# Patient Record
Sex: MALE | Race: WHITE | NOT HISPANIC OR LATINO | Employment: UNEMPLOYED | URBAN - METROPOLITAN AREA
[De-identification: names, ages, dates, MRNs, and addresses within clinical notes are randomized per-mention and may not be internally consistent; named-entity substitution may affect disease eponyms.]

---

## 2017-05-05 RX ORDER — ALBUTEROL SULFATE 90 UG/1
2 AEROSOL, METERED RESPIRATORY (INHALATION) EVERY 4 HOURS PRN
COMMUNITY
End: 2017-05-08

## 2017-05-05 RX ORDER — HYDROCODONE BITARTRATE AND ACETAMINOPHEN 10; 325 MG/1; MG/1
1 TABLET ORAL EVERY 6 HOURS PRN
COMMUNITY
End: 2017-05-08

## 2017-05-05 RX ORDER — IBUPROFEN 800 MG/1
800 TABLET ORAL EVERY 6 HOURS PRN
COMMUNITY
End: 2017-05-08

## 2017-05-05 RX ORDER — LISINOPRIL 20 MG/1
20 TABLET ORAL DAILY
COMMUNITY
End: 2017-05-08

## 2017-05-05 RX ORDER — QUETIAPINE FUMARATE 100 MG/1
100 TABLET, FILM COATED ORAL NIGHTLY
COMMUNITY

## 2017-05-05 RX ORDER — GABAPENTIN 800 MG/1
800 TABLET ORAL 4 TIMES DAILY
COMMUNITY

## 2017-05-05 RX ORDER — TRAMADOL HYDROCHLORIDE 50 MG/1
50 TABLET ORAL EVERY 6 HOURS PRN
COMMUNITY

## 2017-05-05 RX ORDER — FLUOXETINE HYDROCHLORIDE 20 MG/1
20 CAPSULE ORAL DAILY
COMMUNITY

## 2017-05-08 ENCOUNTER — OFFICE VISIT (OUTPATIENT)
Dept: NEUROSURGERY | Facility: CLINIC | Age: 47
End: 2017-05-08

## 2017-05-08 VITALS
BODY MASS INDEX: 22.84 KG/M2 | TEMPERATURE: 98.3 F | DIASTOLIC BLOOD PRESSURE: 80 MMHG | SYSTOLIC BLOOD PRESSURE: 112 MMHG | WEIGHT: 178 LBS | HEIGHT: 74 IN

## 2017-05-08 DIAGNOSIS — M51.36 DEGENERATIVE DISC DISEASE, LUMBAR: Primary | ICD-10-CM

## 2017-05-08 PROCEDURE — 99203 OFFICE O/P NEW LOW 30 MIN: CPT | Performed by: NEUROLOGICAL SURGERY

## 2017-05-08 RX ORDER — NABUMETONE 750 MG/1
750 TABLET, FILM COATED ORAL 2 TIMES DAILY
Qty: 60 TABLET | Refills: 0 | Status: SHIPPED | OUTPATIENT
Start: 2017-05-08

## 2020-11-16 ENCOUNTER — HOSPITAL ENCOUNTER (EMERGENCY)
Facility: HOSPITAL | Age: 50
Discharge: HOME | End: 2020-11-16
Attending: EMERGENCY MEDICINE | Admitting: EMERGENCY MEDICINE

## 2020-11-16 VITALS
RESPIRATION RATE: 18 BRPM | TEMPERATURE: 97.5 F | BODY MASS INDEX: 18.22 KG/M2 | DIASTOLIC BLOOD PRESSURE: 120 MMHG | WEIGHT: 142 LBS | HEIGHT: 74 IN | OXYGEN SATURATION: 98 % | HEART RATE: 91 BPM | SYSTOLIC BLOOD PRESSURE: 158 MMHG

## 2020-11-16 DIAGNOSIS — B35.3 TINEA PEDIS OF BOTH FEET: Primary | ICD-10-CM

## 2020-11-16 DIAGNOSIS — L84 PRE-ULCERATIVE CORN OR CALLOUS: ICD-10-CM

## 2020-11-16 PROCEDURE — 99282 EMERGENCY DEPT VISIT SF MDM: CPT

## 2020-11-16 RX ORDER — MICONAZOLE NITRATE 20 MG/G
POWDER TOPICAL AS NEEDED
Qty: 71 G | Refills: 0 | Status: SHIPPED | OUTPATIENT
Start: 2020-11-16

## 2020-11-16 NOTE — ED PROVIDER NOTES
"Subjective   Mr. Hernandez presents to the emergency department today out of concern for a rash he perceives on his hands arms neck and his feet for several weeks now.  \"If I put Goldbond powder on my skin my hands turn black\".  He alludes to seeing some strange items moving on his skin.  He also refers to seeking attention for this perceived rash several times in other emergency departments without satisfaction.  He denies that it is painful or pruritic.  He denies any illicit drug use or abuse.  Further inquiry to localize the sites of his concern reveal concern out of proportion for well-established calluses.  He denies any medication alterations or additions.      History provided by:  Patient   used: No    Rash  Location: Dorsal hands, entire arms bilaterally and anterior neck and his feet.  Severity:  Unable to specify  Onset quality:  Gradual (4 weeks or more)  Timing:  Unable to specify  Progression:  Unable to specify  Chronicity:  Chronic  Context: not insect bite/sting, not plant contact and not sick contacts    Relieved by:  Nothing  Worsened by:  Nothing  Ineffective treatments:  None tried  Associated symptoms: no diarrhea, no fever, no hoarse voice, no induration, no throat swelling, no tongue swelling, no URI, not vomiting and not wheezing        Review of Systems   Constitutional: Negative.  Negative for appetite change, fever and unexpected weight change (patient appears thin or at least, verylean.  He denies any known weight loss. ).   HENT: Negative for hoarse voice.    Eyes: Negative.    Respiratory: Negative for wheezing.    Gastrointestinal: Negative for diarrhea and vomiting.   Skin: Positive for rash and wound. Negative for color change and pallor.   All other systems reviewed and are negative.      Past Medical History:   Diagnosis Date   • Anxiety    • Arthritis    • Hypertension        No Known Allergies    Past Surgical History:   Procedure Laterality Date   • NO PAST " SURGERIES         Family History   Problem Relation Age of Onset   • Clotting disorder Mother    • Asthma Sister    • Cancer Maternal Grandmother    • Heart disease Maternal Grandfather        Social History     Socioeconomic History   • Marital status: Single     Spouse name: Not on file   • Number of children: Not on file   • Years of education: Not on file   • Highest education level: Not on file   Tobacco Use   • Smoking status: Current Every Day Smoker     Years: 10.00     Types: Cigarettes   Substance and Sexual Activity   • Alcohol use: No   • Drug use: No   • Sexual activity: Yes           Objective   Physical Exam  Vitals signs and nursing note reviewed.   Constitutional:       General: He is not in acute distress.     Appearance: He is not ill-appearing.      Comments: Mr. Tadeo is a fair historian.  He is slightly anxious.  He does not appear to be acutely psychotic.  He appears disheveled and underweight.   HENT:      Head: Normocephalic.      Right Ear: External ear normal.      Left Ear: External ear normal.      Nose: Nose normal.      Mouth/Throat:      Mouth: Mucous membranes are moist.      Pharynx: No oropharyngeal exudate.   Eyes:      Conjunctiva/sclera: Conjunctivae normal.   Neck:      Musculoskeletal: Normal range of motion and neck supple. No muscular tenderness.   Cardiovascular:      Rate and Rhythm: Normal rate and regular rhythm.      Heart sounds: No murmur.   Pulmonary:      Effort: Pulmonary effort is normal. No respiratory distress.      Breath sounds: Normal breath sounds.   Abdominal:      General: Bowel sounds are normal. There is no distension.      Palpations: Abdomen is soft.      Tenderness: There is no abdominal tenderness.   Musculoskeletal: Normal range of motion.         General: No swelling, tenderness or deformity.   Skin:     General: Skin is warm and dry.      Capillary Refill: Capillary refill takes less than 2 seconds.      Coloration: Skin is not pale.       Findings: No lesion.      Comments: Except for a few calluses on his palms, I do not appreciate a rash on the upper body or lower body neither macular or papular.  I do see several skin disruptions that are healing on the dorsal hand that appear to be punctures in a linear fashion over a vein in his hand but Mr. Villatoro denies any intravenous punctures.  Review of his feet shows some interdigital mild tinea pedis and several calluses on his dorsal toes that are well-developed on the tops of his third and fourth toes bilaterally.  His skin is otherwise in very good repair.  He has very little subcu fat   Neurological:      General: No focal deficit present.      Mental Status: He is alert and oriented to person, place, and time.      Comments: No Neurosensory deficit or focal weakness     Psychiatric:         Behavior: Behavior normal.         Thought Content: Thought content normal.         Procedures           ED Course  ED Course as of Nov 16 1713   Mon Nov 16, 2020   1315 I explained to Mr. Tadeo that I do not appreciate an acute condition or rash or disease on his exam today.  He is reasonable and willing to follow-up with his primary provider for further evaluation and, in the meantime, treat his tinea pedis with topical Lotrimin.    [MS]      ED Course User Index  [MS] Yady Lobo, APRN      No results found for this or any previous visit (from the past 24 hour(s)).  Note: In addition to lab results from this visit, the labs listed above may include labs taken at another facility or during a different encounter within the last 24 hours. Please correlate lab times with ED admission and discharge times for further clarification of the services performed during this visit.    No orders to display     Vitals:    11/16/20 1108   BP: (!) 158/120   BP Location: Left arm   Patient Position: Sitting   Pulse: 91   Resp: 18   Temp: 97.5 °F (36.4 °C)   TempSrc: Infrared   SpO2: 98%   Weight: 64.4 kg (142 lb)  "  Height: 188 cm (74\")     Medications - No data to display  ECG/EMG Results (last 24 hours)     ** No results found for the last 24 hours. **        No orders to display       DISCHARGE    Patient discharged in stable condition.    Reviewed implications of results, diagnosis, meds, responsibility to follow up, warning signs and symptoms of possible worsening, potential complications and reasons to return to ER.    Patient/Family voiced understanding of above instructions.    Discussed plan for discharge, as there is no emergent indication for admission.  Pt/family is agreeable and understands need for follow up and possible repeat testing.  Pt/family is aware that discharge does not mean that nothing is wrong but that it indicates no emergency is currently present that requires admission and they must continue care with follow-up as given below or with a physician of their choice.     FOLLOW-UP  No follow-up provider specified.       Medication List      New Prescriptions    Lotrimin AF 2 % powder  Generic drug: miconazole  Apply  topically to the appropriate area as directed As Needed for Itching.           Where to Get Your Medications      You can get these medications from any pharmacy    Bring a paper prescription for each of these medications  · Lotrimin AF 2 % powder                                            MDM    Final diagnoses:   Tinea pedis of both feet   Pre-ulcerative corn or callous            Yady Lobo, APRN  11/16/20 1116    "